# Patient Record
Sex: FEMALE | Race: WHITE
[De-identification: names, ages, dates, MRNs, and addresses within clinical notes are randomized per-mention and may not be internally consistent; named-entity substitution may affect disease eponyms.]

---

## 2019-10-05 ENCOUNTER — HOSPITAL ENCOUNTER (EMERGENCY)
Dept: HOSPITAL 26 - MED | Age: 29
LOS: 1 days | Discharge: HOME | End: 2019-10-06
Payer: COMMERCIAL

## 2019-10-05 VITALS — HEIGHT: 62 IN | WEIGHT: 190 LBS | BODY MASS INDEX: 34.96 KG/M2
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
## 2019-10-05 DIAGNOSIS — T78.40XA: Primary | ICD-10-CM

## 2019-10-05 PROCEDURE — 96372 THER/PROPH/DIAG INJ SC/IM: CPT

## 2019-10-05 PROCEDURE — 99283 EMERGENCY DEPT VISIT LOW MDM: CPT

## 2019-10-06 VITALS — SYSTOLIC BLOOD PRESSURE: 94 MMHG | DIASTOLIC BLOOD PRESSURE: 52 MMHG

## 2019-10-06 VITALS — SYSTOLIC BLOOD PRESSURE: 109 MMHG | DIASTOLIC BLOOD PRESSURE: 69 MMHG

## 2019-10-06 NOTE — NUR
28/F PRESENTED TO ED WITH C/O GENERAL HIVES TO FACE AND NECK, ITCHING, REDNESS, 
AFTER EATING FRUIT X20 MIN AGO. NO SOB/DYSPNEA AT THIS TIME. NO RESPIRATORY 
DISTRESS. EVEN UNLABORED BREATHING. VSS. STATES THIS HAS NEVER HAPPENED BEFORE.



NO HX OR ALLERGIES.

## 2019-10-06 NOTE — NUR
-------------------------------------------------------------------------------

            *** Note undone in EDM - 10/06/19 at 0448 by LIDYA ***             

-------------------------------------------------------------------------------

DISCHARGE PAPERS GIVEN TO PT. STATES RELIEFE. NO SOB/DYSPNEA. VSS. RX OF 
PEPCID, BENTYL, DIPHENHYDRAMINE, PREDNISONE, AND EPIPEN GIVEN. SIDE EFFECTS 
EXPLAINED. INSTRUCTED TO F/U WITH PCP AND WHEN TO RETURN TO ER. PT VERBALLIZED 
UNDERSTANDING OF DC INSTRUCTIONS. ALL QUESTIONS ANSWERED.

## 2019-10-06 NOTE — NUR
DISCHARGE PAPERS GIVEN TO PT. STATES RELIEFE. NO SOB/DYSPNEA. VSS. RX OF 
PEPCID, BENTYL, DIPHENHYDRAMINE, PREDNISONE, AND EPIPEN GIVEN. SIDE EFFECTS 
EXPLAINED. INSTRUCTED TO F/U WITH PCP AND WHEN TO RETURN TO ER. PT VERBALLIZED 
UNDERSTANDING OF DC INSTRUCTIONS. ALL QUESTIONS ANSWERED.

## 2020-08-14 ENCOUNTER — HOSPITAL ENCOUNTER (EMERGENCY)
Dept: HOSPITAL 26 - MED | Age: 30
Discharge: HOME | End: 2020-08-14
Payer: COMMERCIAL

## 2020-08-14 VITALS — HEIGHT: 62 IN | WEIGHT: 162 LBS | BODY MASS INDEX: 29.81 KG/M2

## 2020-08-14 VITALS — SYSTOLIC BLOOD PRESSURE: 119 MMHG | DIASTOLIC BLOOD PRESSURE: 63 MMHG

## 2020-08-14 VITALS — DIASTOLIC BLOOD PRESSURE: 63 MMHG | SYSTOLIC BLOOD PRESSURE: 119 MMHG

## 2020-08-14 DIAGNOSIS — J45.909: ICD-10-CM

## 2020-08-14 DIAGNOSIS — R42: Primary | ICD-10-CM

## 2020-08-14 DIAGNOSIS — R11.0: ICD-10-CM

## 2020-08-14 NOTE — NUR
RECEIVED A 29/F FROM TRIAGE WITH A C/O NAUSEA AND DIZZINESS X 1 DAY. PT REPORTS 
POSSIBLE PREGNANCY BUT UNSURE AS SHE TOOK EMERGENCY CONTRACEPTIVE. PT DENIES 
VOMITTING. IN BED FOR MSE.

## 2020-08-14 NOTE — NUR
Patient discharged with v/s stable. Written and verbal after care instructions 
given and explained. 

Patient alert, oriented and verbalized understanding of instructions. 
Ambulatory with steady gait. All questions addressed prior to discharge. ID 
band removed. Patient advised to follow up with PMD. Rx of ZOFRAN given. 
Patient educated on indication of medication including possible reaction and 
side effects. Opportunity to ask questions provided and answered. complains of pain/discomfort